# Patient Record
Sex: FEMALE | Race: WHITE | NOT HISPANIC OR LATINO | URBAN - METROPOLITAN AREA
[De-identification: names, ages, dates, MRNs, and addresses within clinical notes are randomized per-mention and may not be internally consistent; named-entity substitution may affect disease eponyms.]

---

## 2022-02-24 ENCOUNTER — MEDICAL CORRESPONDENCE (OUTPATIENT)
Dept: HEALTH INFORMATION MANAGEMENT | Facility: CLINIC | Age: 62
End: 2022-02-24
Payer: COMMERCIAL

## 2022-03-07 ENCOUNTER — TRANSFERRED RECORDS (OUTPATIENT)
Dept: HEALTH INFORMATION MANAGEMENT | Facility: CLINIC | Age: 62
End: 2022-03-07
Payer: COMMERCIAL

## 2022-03-18 ENCOUNTER — APPOINTMENT (OUTPATIENT)
Dept: RADIATION ONCOLOGY | Facility: CLINIC | Age: 62
End: 2022-03-18
Payer: COMMERCIAL

## 2022-03-18 PROCEDURE — 77263 THER RADIOLOGY TX PLNG CPLX: CPT | Performed by: RADIOLOGY

## 2022-03-18 NOTE — PROGRESS NOTES
Dear Colleagues,  Today Tiffany Herrera was seen in consultation.  IDENTIFICATION: This is a 61 year old woman with bilateral breast cancer s/p two lumpectomies and SLNBx on the left side and single lumpectomy and SLNBx on the right. On the left she had Grade 1 IDCA w/ G1 DCIS, pT1bN0 ER/UT positive HER-2/misael negative.  On the right she had G2 IDCA w/ G3 DCIS excised with close noninvasive margin, pT1cN0 ER/UT positive HER-2/misael negative.  She is now referred for adjuvant radiation therapy. Oncotype was 17 and she will not be receiving chemotherapy.  HISTORY OF PRESENT ILLNESS: Tiffany Herrera was in her usual state of health this past year. On November 26, 2021 bilateral screening mammogram showed within the left breast 12 o'clock position indeterminate calcifications.  There were also asymmetry indeterminate calcifications in the right breast at the 1 o'clock position.  On October 8, 2021 bilateral diagnostic mammogram confirmed pleomorphic calcification of the left breast 12 o'clock position and within the right breast 1 o'clock position irregular spiculated mass with associated calcifications.  By ultrasound the right breast lesion measured 1.2 cm in greatest dimension and there was no obvious right axillary lymphadenopathy.  On December 21, 2021 left breast stereotactic biopsy was consistent with fibroadenoma with calcifications.  Same-day right breast ultrasound-guided biopsy was consistent with grade 2 invasive ductal carcinoma with grade 3 DCIS, ER/UT positive HER-2/misael negative.  On December 29, 2021 breast MRI showed within the right breast 1 o'clock position a 2 cm abnormal mass with no visualized lymphadenopathy or other suspicious enhancement within the right breast.  Within the left breast there was asymmetric non-mass enhancement which is considered indeterminate.  On January 6, 2021 left breast diagnostic mammogram confirmed a spiculated mass at the 12 o'clock position in the retroareolar plane  associated with calcifications there is no definite sonographic correlate.  Stereotactic biopsy of the left breast lesion was consistent with grade 1 IDC with associated calcifications.   Then on January 18, 2021 there was MRI guided breast biopsy left upper outer quadrant area. On this additional left breast biopsy findings were consistent with ADH suspicious for low-grade DCIS.  On January 25, 2021 left breast ultrasound was completed and findings were consistent with hematoma.  There was an attempted aspiration.  It was thought that the marking clip was likely extruded to the hematoma.  Therefore on February 10, 2022 there was an another MRI guided breast biopsy.  By this time the patient was diagnosed with bilateral breast cancer.  She had newly diagnosed grade 1-2 invasive ductal carcinoma with associated grade 2-3 DCIS of the right breast and also 2 areas of disease within the left breast.  On February 17, 2022 Dr. Lewis took her to the OR for bilateral ultrasound-guided radioactive seed localized lumpectomy of both breasts.  Pathology revealed within the left breast 12 o'clock position 0.9cm of Grade 1 IDCA, with no adjacent DCIS or LVSI.  There were negative margins 0 out of 2 involved lymph nodes giving her a pathologic stage of pT1bN0 ER/AL positive HER-2/misael negative.  At the 1 o'clock position there was at least 1 cm of grade 1 DCIS with adequate margins excised. Frederic lymph node was completed.  Within the right breast there was 1.7cm of Grade 2 IDCA with DCIS and no LVSI. (DCIS was grade 3 by the biopsy) Negative invasive margins and close DCIS margins. 0 out of 2 lymph nodes removed giving her a pathologic stage of pT1cN0 ER/AL positive HER-2/misael negative  Specimen radiograph was not completed.  Her postoperative course has been unenventful but she has a diagnosis of right sided frozen shoulder.  She was recently seen by Dr. Nieto.  Her Oncotype score returned back as low 17. The benefit of  treatment did not outweigh the risks and no systemic chemotherapy is recommended.  She is to receive adjuvant endocrine therapy after XRT.    Since her surgery, she has continued to heal well and denies any significant pain.  She has fair ROM on the right and good ROM on the left.  She denies any other new masses, skin changes, shortness of breath, chest or bony pain, or new neurologic symptoms. She is being seen here today for consideration of postoperative radiotherapy.  REVIEW OF SYSTEMS: As per HPI, a 14-point review of system is otherwise negative.  PAST RADIATION THERAPY:  Denies.  PAST CTD/PACEMAKER: Denies  BREAST RISK FACTORS:  prior left breast biopsy in  benign per pt, she also has had cysts removed from both breast. No family history of ovarian cancer. Maternal grandmother with breast cancer.  She started her menses at age 11.   with her first delivery at age 21. She did not breastfeed.  Menopause early 50s. No history of HRT or OCP use.    Past Medical History:   Diagnosis Date     Adenoma of large intestine 2014    Formatting of this note might be different from the original. next colonoscopy 2017     ADHD (attention deficit hyperactivity disorder), combined type 2012     Allergic rhinitis 2006    Formatting of this note might be different from the original. Rhinitis Allergic  NOS     Cervical high risk HPV (human papillomavirus) test positive 2018    Formatting of this note might be different from the original. [facility] Review:  History:  2018: NILM HPV+16 / colp neg : NILM HPV neg  Plan, per ASCCP guidelines: co-test in 3 years ()     Combined form of senile cataract of both eyes 2020    Formatting of this note might be different from the original. Added automatically from request for surgery [id number]     Depressive disorder 2004    Formatting of this note might be different from the original. LW Onset:   ; Depression  NOS     MUKESH (generalized anxiety  disorder) 2020     Malignant neoplasm of upper-inner quadrant of right breast in female, estrogen receptor positive (H) 2021     Moderate persistent asthma without complication 2017     Osteoarthrosis, hand 2011    Formatting of this note might be different from the original. DJD Hand     Pure hypercholesterolemia 10/23/2012     Uncontrolled type 1 diabetes mellitus (H) 2013    Formatting of this note might be different from the original. Type I (juvenile type) diabetes mellitus without mention of complication, uncontrolled ([facility])       Past Surgical History:   Procedure Laterality Date     APPENDECTOMY       BREAST BIOPSY, RT/LT Left     2013     BREAST BIOPSY, RT/LT Bilateral     Bilateral- 2021, L breast bx-2022 and 2022     BREAST CYST EXCISION Bilateral     Age 19     CATARACT EXTRACTION        SECTION       LUMPECTOMY BREAST BILATERAL      Bilateral breast lumpectomy with SLN bx Dr. Lewis 2022     NC ANESTH,HUMERUS REPAIR      R humerus plate and 10 screws- Sept. 3, 2021     TUBAL LIGATION         Family History   Problem Relation Age of Onset     Prostate Cancer Father      Colon Cancer Sister      Breast Cancer Maternal Grandmother        Social History     Tobacco Use     Smoking status: Former Smoker     Packs/day: 0.50     Years: 15.00     Pack years: 7.50     Types: Cigarettes     Quit date: 2021     Years since quittin.2     Smokeless tobacco: Never Used   Substance Use Topics     Alcohol use: Yes     Alcohol/week: 5.0 standard drinks     Types: 5 Standard drinks or equivalent per week     Comment: 5 per week     Current Outpatient Medications   Medication     albuterol (PROAIR HFA/PROVENTIL HFA/VENTOLIN HFA) 108 (90 Base) MCG/ACT inhaler     albuterol (PROVENTIL) (2.5 MG/3ML) 0.083% neb solution     atorvastatin (LIPITOR) 40 MG tablet     blood glucose (CONTOUR NEXT TEST) test strip     fluticasone-salmeterol (ADVAIR HFA) 230-21 MCG/ACT  inhaler     insulin aspart (NOVOLOG PEN) 100 UNIT/ML pen     insulin glargine (LANTUS VIAL) 100 UNIT/ML vial     letrozole (FEMARA) 2.5 MG tablet     montelukast (SINGULAIR) 10 MG tablet     sertraline (ZOLOFT) 50 MG tablet     [START ON 5/23/2022] amphetamine-dextroamphetamine (ADDERALL XR) 25 MG 24 hr capsule     BD PEN NEEDLE CARLEEN 2ND GEN 32G X 4 MM miscellaneous     Continuous Blood Gluc Sensor (FREESTYLE AMOS 14 DAY SENSOR) MISC     SEMGLEE, YFGN, 100 UNIT/ML SOPN     No current facility-administered medications for this visit.        Allergies   Allergen Reactions     Azithromycin Other (See Comments)     Swelling     Aspirin Hives     Cefaclor      Cephalexin Swelling     Mouth swelling     Ciprofloxacin Hives     Ibuprofen Hives     Penicillins Hives     PHYSICAL EXAMINATION:  /71 (BP Location: Left arm, Patient Position: Sitting, Cuff Size: Adult Regular)   Pulse 95   Temp 98.6  F (37  C) (Oral)   Resp 16   Wt 59.4 kg (130 lb 14.4 oz)   SpO2 97%   GENERAL Well-appearing woman in no acute distress.  HEENT Normocephalic, atraumatic.  Sclerae anicteric.  CVR  Regular rate and rhythm.  No murmurs, rubs, or gallops.  LUNGS Clear to auscultation bilaterally.  BREASTS Breasts are examined in the supine and upright position.  The breasts are symmetric.  Within the right upper outer breast and right axilla there are two well healed incisions.  Firmness of these incisions is not suspicious.  There is no suspicious erythema, ulceration or nodularity within the right breast. Within the left upper and periareolar breast and left axilla there are three well healed incisions.  Firmness of these incisions is not suspicious.  There is no suspicious erythema, ulceration or nodularity within the left breast.  There is no erythema, retraction, desquamation or discharge appreciated within the right or left nipple areolar complex.    LYMPH No supraclavicular, infraclavicular, or axillary lymphadenopathy appreciated  bilaterally.  ABDOMEN Soft.    EXT  No clubbing or cyanosis or edema.    NEURO No focal deficits.  MSK  She has fair ROM on the right and good ROM on the left.    SKIN  Warm and well perfused.    PSYCH  Alert and oriented x 3    ECOG PERFORMANCE STATUS: 1    IMPRESSION/PLAN: Tiffany Herrera is a 61 year old woman with bilateral breast cancer s/p two lumpectomies and SLNBx on the left side and single lumpectomy and SLNBx on the right. On the left she had Grade 1 IDCA w/ G1 DCIS, pT1bN0 ER/TX positive HER-2/misael negative.  On the right she had G2 IDCA w/ G3 DCIS excised with close noninvasive margin, pT1cN0 ER/TX positive HER-2/misael negative.  She is now referred for adjuvant radiation therapy. Oncotype was 17 and she will not be receiving chemotherapy.  I recommend adjuvant XRT to both breasts to improve local control and overall survival.  Plan is to treat the affected breasts based on multiple randomized prospective data which show decrease risk of local recurrence by ~50% with the addition of XRT to BCS and the EBCTCG metaanalysis published in Lancet 2011 which shows that for every 4 recurrences avoided by year 10 one breast cancer death is avoided by year 15.  Given her HG disease and close DCIS margin she will also receive a tumor cavity boost based EORTC and Mccrary Boost Trials.  The risks, benefits, treatment rationale and regimen of radiation therapy to the breast were discussed in great detail today with the patient and her .  Risks include but are not limited to skin erythema, desquamation, hyperpigmentation, breast and lymphedema, fibrosis, telengectasia, pneumonitis, cardiac toxicity, rib fracture, exacerbation of her frozen shoulder, shoulder discomfort and arthritis and secondary malignancy. The patient consented to therapy and is scheduled for a CT simulation to be completed in approximately 2 weeks.  In the interim I have asked her to continue with her physical therapy to get her right arm into the  appropriate position for radiation treatment.  Additional problem list to be addressed in the following manner:  1. Systemic/hormonal treatment : No systemic chemo recommended.  Will f/u with Med Onc 1-2 weeks after XRT completed to discuss adjuvant endocrine therapy.   2. Bilateral postop mmg ordered.  There was ample time for questions and all were answered to the patient's satisfaction. Thank you for allowing me to participate in the care of this pleasant patient. If you have any questions, please do not hesitate to contact my office.    Sincerely,  Woodrow Billingsley MD

## 2022-03-21 ENCOUNTER — OFFICE VISIT (OUTPATIENT)
Dept: RADIATION ONCOLOGY | Facility: CLINIC | Age: 62
End: 2022-03-21
Payer: COMMERCIAL

## 2022-03-21 VITALS
SYSTOLIC BLOOD PRESSURE: 129 MMHG | RESPIRATION RATE: 16 BRPM | OXYGEN SATURATION: 97 % | TEMPERATURE: 98.6 F | DIASTOLIC BLOOD PRESSURE: 71 MMHG | HEART RATE: 95 BPM | WEIGHT: 130.9 LBS

## 2022-03-21 DIAGNOSIS — C50.211 MALIGNANT NEOPLASM OF UPPER-INNER QUADRANT OF RIGHT BREAST IN FEMALE, ESTROGEN RECEPTOR POSITIVE (H): Primary | ICD-10-CM

## 2022-03-21 DIAGNOSIS — Z17.0 MALIGNANT NEOPLASM OF UPPER-INNER QUADRANT OF RIGHT BREAST IN FEMALE, ESTROGEN RECEPTOR POSITIVE (H): Primary | ICD-10-CM

## 2022-03-21 DIAGNOSIS — C50.812 MALIGNANT NEOPLASM OF OVERLAPPING SITES OF LEFT BREAST IN FEMALE, ESTROGEN RECEPTOR POSITIVE (H): ICD-10-CM

## 2022-03-21 DIAGNOSIS — Z17.0 MALIGNANT NEOPLASM OF OVERLAPPING SITES OF LEFT BREAST IN FEMALE, ESTROGEN RECEPTOR POSITIVE (H): ICD-10-CM

## 2022-03-21 PROBLEM — J45.40 MODERATE PERSISTENT ASTHMA WITHOUT COMPLICATION: Status: ACTIVE | Noted: 2017-09-11

## 2022-03-21 PROBLEM — F41.1 GAD (GENERALIZED ANXIETY DISORDER): Status: ACTIVE | Noted: 2020-09-20

## 2022-03-21 PROBLEM — R87.810 CERVICAL HIGH RISK HPV (HUMAN PAPILLOMAVIRUS) TEST POSITIVE: Status: ACTIVE | Noted: 2018-07-24

## 2022-03-21 PROBLEM — H25.813 COMBINED FORM OF SENILE CATARACT OF BOTH EYES: Status: ACTIVE | Noted: 2020-12-17

## 2022-03-21 PROCEDURE — 99205 OFFICE O/P NEW HI 60 MIN: CPT | Performed by: RADIOLOGY

## 2022-03-21 RX ORDER — FLUTICASONE PROPIONATE AND SALMETEROL XINAFOATE 230; 21 UG/1; UG/1
2 AEROSOL, METERED RESPIRATORY (INHALATION)
COMMUNITY
Start: 2020-04-20

## 2022-03-21 RX ORDER — INSULIN GLARGINE 100 [IU]/ML
14 INJECTION, SOLUTION SUBCUTANEOUS
COMMUNITY
Start: 2022-02-09 | End: 2023-02-09

## 2022-03-21 RX ORDER — PEN NEEDLE, DIABETIC 32GX 5/32"
NEEDLE, DISPOSABLE MISCELLANEOUS
COMMUNITY
Start: 2022-02-09

## 2022-03-21 RX ORDER — FLASH GLUCOSE SENSOR
KIT MISCELLANEOUS
COMMUNITY
Start: 2022-02-10

## 2022-03-21 RX ORDER — INSULIN GLARGINE-YFGN 100 [IU]/ML
INJECTION, SOLUTION SUBCUTANEOUS
COMMUNITY
Start: 2022-01-25

## 2022-03-21 RX ORDER — ATORVASTATIN CALCIUM 40 MG/1
40 TABLET, FILM COATED ORAL DAILY
COMMUNITY
Start: 2022-02-09

## 2022-03-21 RX ORDER — ALBUTEROL SULFATE 90 UG/1
AEROSOL, METERED RESPIRATORY (INHALATION)
COMMUNITY
Start: 2021-06-02

## 2022-03-21 RX ORDER — ALBUTEROL SULFATE 0.83 MG/ML
SOLUTION RESPIRATORY (INHALATION)
COMMUNITY
Start: 2021-06-15

## 2022-03-21 RX ORDER — MONTELUKAST SODIUM 10 MG/1
10 TABLET ORAL
COMMUNITY
Start: 2021-08-07 | End: 2022-08-07

## 2022-03-21 RX ORDER — LETROZOLE 2.5 MG/1
2.5 TABLET, FILM COATED ORAL DAILY
COMMUNITY
Start: 2022-03-17 | End: 2023-03-17

## 2022-03-21 RX ORDER — DEXTROAMPHETAMINE SACCHARATE, AMPHETAMINE ASPARTATE MONOHYDRATE, DEXTROAMPHETAMINE SULFATE AND AMPHETAMINE SULFATE 6.25; 6.25; 6.25; 6.25 MG/1; MG/1; MG/1; MG/1
25 CAPSULE, EXTENDED RELEASE ORAL
COMMUNITY
Start: 2022-05-23

## 2022-03-21 ASSESSMENT — PAIN SCALES - GENERAL: PAINLEVEL: MILD PAIN (2)

## 2022-03-21 NOTE — Clinical Note
3/21/2022         RE: Tiffany Herrera  [id number]  [facility] [address]        Dear Colleague,    Thank you for referring your patient, Tiffany Herrera, to the [facility] RADIATION ONCOLOGY [place]. Please see a copy of my visit note below.    Dear Colleagues,  Today Tiffany Herrera was seen in consultation.  IDENTIFICATION: This is a 61 year old woman with bilateral breast cancer s/p two lumpectomies and SLNBx on the left side and single lumpectomy and SLNBx on the right. On the left she had Grade 1 IDCA w/ G1 DCIS, pT1bN0 ER/TX positive HER-2/misael negative.  On the right she had G2 IDCA w/ G3 DCIS excised with close noninvasive margin, pT1cN0 ER/TX positive HER-2/misael negative.  She is now referred for adjuvant radiation therapy. Oncotype was 17 and she will not be receiving chemotherapy.  HISTORY OF PRESENT ILLNESS: Tiffany Herrera was in her usual state of health this past year. On November 26, 2021 bilateral screening mammogram showed within the left breast 12 o'clock position indeterminate calcifications.  There were also asymmetry indeterminate calcifications in the right breast at the 1 o'clock position.  On October 8, 2021 bilateral diagnostic mammogram confirmed pleomorphic calcification of the left breast 12 o'clock position and within the right breast 1 o'clock position irregular spiculated mass with associated calcifications.  By ultrasound the right breast lesion measured 1.2 cm in greatest dimension and there was no obvious right axillary lymphadenopathy.  On December 21, 2021 left breast stereotactic biopsy was consistent with fibroadenoma with calcifications.  Same-day right breast ultrasound-guided biopsy was consistent with grade 2 invasive ductal carcinoma with grade 3 DCIS, ER/TX positive HER-2/misael negative.  On December 29, 2021 breast MRI showed within the right breast 1 o'clock position a 2 cm abnormal mass with no visualized lymphadenopathy or other suspicious enhancement  within the right breast.  Within the left breast there was asymmetric non-mass enhancement which is considered indeterminate.  On January 6, 2021 left breast diagnostic mammogram confirmed a spiculated mass at the 12 o'clock position in the retroareolar plane associated with calcifications there is no definite sonographic correlate.  Stereotactic biopsy of the left breast lesion was consistent with grade 1 IDC with associated calcifications.   Then on January 18, 2021 there was MRI guided breast biopsy left upper outer quadrant area. On this additional left breast biopsy findings were consistent with ADH suspicious for low-grade DCIS.  On January 25, 2021 left breast ultrasound was completed and findings were consistent with hematoma.  There was an attempted aspiration.  It was thought that the marking clip was likely extruded to the hematoma.  Therefore on February 10, 2022 there was an another MRI guided breast biopsy.  By this time the patient was diagnosed with bilateral breast cancer.  She had newly diagnosed grade 1-2 invasive ductal carcinoma with associated grade 2-3 DCIS of the right breast and also 2 areas of disease within the left breast.  On February 17, 2022 Dr. Lewis took her to the OR for bilateral ultrasound-guided radioactive seed localized lumpectomy of both breasts.  Pathology revealed within the left breast 12 o'clock position 0.9cm of Grade 1 IDCA, with no adjacent DCIS or LVSI.  There were negative margins 0 out of 2 involved lymph nodes giving her a pathologic stage of pT1bN0 ER/MI positive HER-2/misael negative.  At the 1 o'clock position there was at least 1 cm of grade 1 DCIS with adequate margins excised. [place] lymph node was completed.  Within the right breast there was 1.7cm of Grade 2 IDCA with DCIS and no LVSI. (DCIS was grade 3 by the biopsy) Negative invasive margins and close DCIS margins. 0 out of 2 lymph nodes removed giving her a pathologic stage of pT1cN0 ER/MI positive  HER-2/misael negative  Specimen radiograph was not completed.  Her postoperative course has been unenventful but she has a diagnosis of right sided frozen shoulder.  She was recently seen by Dr. Nieto.  Her Oncotype score returned back as low 17. The benefit of treatment did not outweigh the risks and no systemic chemotherapy is recommended.  She is to receive adjuvant endocrine therapy after XRT.    Since her surgery, she has continued to heal well and denies any significant pain.  She has fair ROM on the right and good ROM on the left.  She denies any other new masses, skin changes, shortness of breath, chest or bony pain, or new neurologic symptoms. She is being seen here today for consideration of postoperative radiotherapy.  REVIEW OF SYSTEMS: As per HPI, a 14-point review of system is otherwise negative.  PAST RADIATION THERAPY:  Denies.  PAST CTD/PACEMAKER: Denies  BREAST RISK FACTORS:  prior left breast biopsy in  benign per pt, she also has had cysts removed from both breast. No family history of ovarian cancer. Maternal grandmother with breast cancer.  She started her menses at age 11.   with her first delivery at age 21. She did not breastfeed.  Menopause early 50s. No history of HRT or OCP use.    Past Medical History:   Diagnosis Date     Adenoma of large intestine 2014    Formatting of this note might be different from the original. next colonoscopy 2017     ADHD (attention deficit hyperactivity disorder), combined type 2012     Allergic rhinitis 2006    Formatting of this note might be different from the original. Rhinitis Allergic  NOS     Cervical high risk HPV (human papillomavirus) test positive 2018    Formatting of this note might be different from the original. [facility] Review:  History:  2018: NILM HPV+16 / colp neg : NILM HPV neg  Plan, per ASCCP guidelines: co-test in 3 years ()     Combined form of senile cataract of both eyes 2020    Formatting of  this note might be different from the original. Added automatically from request for surgery [id number]     Depressive disorder 2004    Formatting of this note might be different from the original. LW Onset:   ; Depression  NOS     MUKESH (generalized anxiety disorder) 2020     Malignant neoplasm of upper-inner quadrant of right breast in female, estrogen receptor positive (H) 2021     Moderate persistent asthma without complication 2017     Osteoarthrosis, hand 2011    Formatting of this note might be different from the original. DJD Hand     Pure hypercholesterolemia 10/23/2012     Uncontrolled type 1 diabetes mellitus (H) 2013    Formatting of this note might be different from the original. Type I (juvenile type) diabetes mellitus without mention of complication, uncontrolled ([facility]C)       Past Surgical History:   Procedure Laterality Date     APPENDECTOMY       BREAST BIOPSY, RT/LT Left     2013     BREAST BIOPSY, RT/LT Bilateral     Bilateral- 2021, L breast bx-2022 and 2022     BREAST CYST EXCISION Bilateral     Age 19     CATARACT EXTRACTION        SECTION       LUMPECTOMY BREAST BILATERAL      Bilateral breast lumpectomy with SLN bx Dr. Lewis 2022     RI ANESTH,HUMERUS REPAIR      R humerus plate and 10 screws- Sept. 3, 2021     TUBAL LIGATION         Family History   Problem Relation Age of Onset     Prostate Cancer Father      Colon Cancer Sister      Breast Cancer Maternal Grandmother        Social History     Tobacco Use     Smoking status: Former Smoker     Packs/day: 0.50     Years: 15.00     Pack years: 7.50     Types: Cigarettes     Quit date: 2021     Years since quittin.2     Smokeless tobacco: Never Used   Substance Use Topics     Alcohol use: Yes     Alcohol/week: 5.0 standard drinks     Types: 5 Standard drinks or equivalent per week     Comment: 5 per week     Current Outpatient Medications   Medication     albuterol (PROAIR  HFA/PROVENTIL HFA/VENTOLIN HFA) 108 (90 Base) MCG/ACT inhaler     albuterol (PROVENTIL) (2.5 MG/3ML) 0.083% neb solution     atorvastatin (LIPITOR) 40 MG tablet     blood glucose (CONTOUR NEXT TEST) test strip     fluticasone-salmeterol (ADVAIR HFA) 230-21 MCG/ACT inhaler     insulin aspart (NOVOLOG PEN) 100 UNIT/ML pen     insulin glargine (LANTUS VIAL) 100 UNIT/ML vial     letrozole (FEMARA) 2.5 MG tablet     montelukast (SINGULAIR) 10 MG tablet     sertraline (ZOLOFT) 50 MG tablet     [START ON 5/23/2022] amphetamine-dextroamphetamine (ADDERALL XR) 25 MG 24 hr capsule     BD PEN NEEDLE CARLEEN 2ND GEN 32G X 4 MM miscellaneous     Continuous Blood Gluc Sensor (FREESTYLE AMOS 14 DAY SENSOR) MISC     SEMGLEE, YFGN, 100 UNIT/ML SOPN     No current facility-administered medications for this visit.        Allergies   Allergen Reactions     Azithromycin Other (See Comments)     Swelling     Aspirin Hives     Cefaclor      Cephalexin Swelling     Mouth swelling     Ciprofloxacin Hives     Ibuprofen Hives     Penicillins Hives     PHYSICAL EXAMINATION:  /71 (BP Location: Left arm, Patient Position: Sitting, Cuff Size: Adult Regular)   Pulse 95   Temp 98.6  F (37  C) (Oral)   Resp 16   Wt 59.4 kg (130 lb 14.4 oz)   SpO2 97%   GENERAL Well-appearing woman in no acute distress.  HEENT Normocephalic, atraumatic.  Sclerae anicteric.  CVR  Regular rate and rhythm.  No murmurs, rubs, or gallops.  LUNGS Clear to auscultation bilaterally.  BREASTS Breasts are examined in the supine and upright position.  The breasts are symmetric.  Within the right upper outer breast and right axilla there are two well healed incisions.  Firmness of these incisions is not suspicious.  There is no suspicious erythema, ulceration or nodularity within the right breast. Within the left upper and periareolar breast and left axilla there are three well healed incisions.  Firmness of these incisions is not suspicious.  There is no suspicious  erythema, ulceration or nodularity within the left breast.  There is no erythema, retraction, desquamation or discharge appreciated within the right or left nipple areolar complex.    LYMPH No supraclavicular, infraclavicular, or axillary lymphadenopathy appreciated bilaterally.  ABDOMEN Soft.    EXT  No clubbing or cyanosis or edema.    NEURO No focal deficits.  MSK  She has fair ROM on the right and good ROM on the left.    SKIN  Warm and well perfused.    PSYCH  Alert and oriented x 3    ECOG PERFORMANCE STATUS: 1    IMPRESSION/PLAN: Tiffany Herrera is a 61 year old woman with bilateral breast cancer s/p two lumpectomies and SLNBx on the left side and single lumpectomy and SLNBx on the right. On the left she had Grade 1 IDCA w/ G1 DCIS, pT1bN0 ER/[place] positive HER-2/misael negative.  On the right she had G2 IDCA w/ G3 DCIS excised with close noninvasive margin, pT1cN0 ER/[place] positive HER-2/misael negative.  She is now referred for adjuvant radiation therapy. Oncotype was 17 and she will not be receiving chemotherapy.  I recommend adjuvant XRT to both breasts to improve local control and overall survival.  Plan is to treat the affected breasts based on multiple randomized prospective data which show decrease risk of local recurrence by ~50% with the addition of XRT to BCS and the EBCTCG metaanalysis published in Lancet 2011 which shows that for every 4 recurrences avoided by year 10 one breast cancer death is avoided by year 15.  Given her HG disease and close DCIS margin she will also receive a tumor cavity boost based EORTC and Mccrary Boost Trials.  The risks, benefits, treatment rationale and regimen of radiation therapy to the breast were discussed in great detail today with the patient and her .  Risks include but are not limited to skin erythema, desquamation, hyperpigmentation, breast and lymphedema, fibrosis, telengectasia, pneumonitis, cardiac toxicity, rib fracture, exacerbation of her frozen shoulder,  shoulder discomfort and arthritis and secondary malignancy. The patient consented to therapy and is scheduled for a CT simulation to be completed in approximately 2 weeks.  In the interim I have asked her to continue with her physical therapy to get her right arm into the appropriate position for radiation treatment.  Additional problem list to be addressed in the following manner:  1. Systemic/hormonal treatment : No systemic chemo recommended.  Will f/u with Med Onc 1-2 weeks after XRT completed to discuss adjuvant endocrine therapy.   2. Bilateral postop mmg ordered.  There was ample time for questions and all were answered to the patient's satisfaction. Thank you for allowing me to participate in the care of this pleasant patient. If you have any questions, please do not hesitate to contact my office.    Sincerely,  Woodrow Billingsley MD            Again, thank you for allowing me to participate in the care of your patient.        Sincerely,        ELMER Billingsley MD

## 2022-03-21 NOTE — NURSING NOTE
REASON FOR APPOINTMENT   Type of Cancer: Bilateral breast cancer ER+  Location: Bilateral breasts  Date of Symptom Onset: Screening mammogram 11/26/2021    TREATMENT TO-DATE FOR THIS CANCER  Surgery ? Bilateral breast lumpectomy with SLN bx Dr. Lewis 2/17/2022   Chemotherapy ? no   Other Treatments for this Cancer ? no    PERSONAL HISTORY OF CANCER   Previous Cancer ? no   Prior Radiation ? no   Prior Chemotherapy ? no   Prior Hormonal Therapy ? no     RECENT IMAGING STUDIES  MRI (date: 1/18/2022, location: [facility])    REFERRALS NEEDED  no    VITALS  /71 (BP Location: Left arm, Patient Position: Sitting, Cuff Size: Adult Regular)   Pulse 95   Temp 98.6  F (37  C) (Oral)   Resp 16   Wt 59.4 kg (130 lb 14.4 oz)   SpO2 97%     PACEMAKER/IMPLANTED CARDIAC DEVICE no    PAIN  Denies pain related to visit    PSYCHOSOCIAL  Marital Status:   Patient lives in home with spouse.  Number of children: 2  Working status: Patient works at a bank  Do you feel safe in your home? Yes    REVIEW OF SYSTEMS  Skin: negative  Eyes: positive for glasses  Ears/Nose/Throat: negative  Respiratory: No shortness of breath, dyspnea on exertion, cough, or hemoptysis  Cardiovascular: negative  Gastrointestinal: negative  Genitourinary: negative  Musculoskeletal: positive for arthritis, joint pain and joint stiffness  Neurologic: negative  Psychiatric: negative  Hematologic/Lymphatic/Immunologic: negative  Endocrine: positive for diabetes    WOMEN ONLY  Any chance you may be pregnant: No  Age at first period: 11  Date of last period: menopause early 50's  Number of pregnancies: 2  Live births: 2  Age 1st delivery: 21  Breast feeding: no  HRT: no  Birth Control pills: No      Radiation Oncology Patient Teaching    Current Concern: Bilateral breast cancer    Person involved with teaching: Patient and   Patient asked Questions: Yes  Patient was cooperative: Yes  Patient was receptive (willing to accept information  given): Yes    Education Assessment  Comprehension ability: High  Knowledge level: Medium  Factors affecting teaching: None    Education Materials Given  Radiation therapy side effects for breast cancer, skin cares, fatigue    Educational Topics Discussed  Side effects    Response To Teaching  Verbalizes understanding    Do you have an advanced directive or living will? no  Are you DNR/DNI? no    Danae Yang RN

## 2022-03-22 ENCOUNTER — PATIENT OUTREACH (OUTPATIENT)
Dept: RADIATION ONCOLOGY | Facility: CLINIC | Age: 62
End: 2022-03-22

## 2022-03-22 ENCOUNTER — APPOINTMENT (OUTPATIENT)
Dept: RADIATION ONCOLOGY | Facility: CLINIC | Age: 62
End: 2022-03-22
Payer: COMMERCIAL

## 2022-03-22 PROCEDURE — 77332 RADIATION TREATMENT AID(S): CPT | Mod: 59 | Performed by: RADIOLOGY

## 2022-03-22 PROCEDURE — 77334 RADIATION TREATMENT AID(S): CPT | Performed by: RADIOLOGY

## 2022-03-22 PROCEDURE — 77290 THER RAD SIMULAJ FIELD CPLX: CPT | Performed by: RADIOLOGY

## 2022-03-22 NOTE — PROGRESS NOTES
Informed patient that order for mammogram was faxed to [facility] and they will be contacting her to schedule the appointment. Patient to please call our office with date of mammogram once it is scheduled. Provided Dr. Billingsley's office number to patient. No further questions or concerns at this time.     Danae Yang RN

## 2022-03-23 DIAGNOSIS — Z17.0 MALIGNANT NEOPLASM OF UPPER-INNER QUADRANT OF RIGHT BREAST IN FEMALE, ESTROGEN RECEPTOR POSITIVE (H): Primary | ICD-10-CM

## 2022-03-23 DIAGNOSIS — C50.211 MALIGNANT NEOPLASM OF UPPER-INNER QUADRANT OF RIGHT BREAST IN FEMALE, ESTROGEN RECEPTOR POSITIVE (H): Primary | ICD-10-CM

## 2022-03-31 ENCOUNTER — LAB (OUTPATIENT)
Dept: LAB | Facility: CLINIC | Age: 62
End: 2022-03-31
Payer: COMMERCIAL

## 2022-03-31 DIAGNOSIS — C50.211 MALIGNANT NEOPLASM OF UPPER-INNER QUADRANT OF RIGHT BREAST IN FEMALE, ESTROGEN RECEPTOR POSITIVE (H): ICD-10-CM

## 2022-03-31 DIAGNOSIS — Z17.0 MALIGNANT NEOPLASM OF UPPER-INNER QUADRANT OF RIGHT BREAST IN FEMALE, ESTROGEN RECEPTOR POSITIVE (H): ICD-10-CM

## 2022-03-31 LAB — SARS-COV-2 RNA RESP QL NAA+PROBE: NEGATIVE

## 2022-03-31 PROCEDURE — U0005 INFEC AGEN DETEC AMPLI PROBE: HCPCS

## 2022-03-31 PROCEDURE — U0003 INFECTIOUS AGENT DETECTION BY NUCLEIC ACID (DNA OR RNA); SEVERE ACUTE RESPIRATORY SYNDROME CORONAVIRUS 2 (SARS-COV-2) (CORONAVIRUS DISEASE [COVID-19]), AMPLIFIED PROBE TECHNIQUE, MAKING USE OF HIGH THROUGHPUT TECHNOLOGIES AS DESCRIBED BY CMS-2020-01-R: HCPCS

## 2022-04-03 ENCOUNTER — HEALTH MAINTENANCE LETTER (OUTPATIENT)
Age: 62
End: 2022-04-03

## 2022-04-04 ENCOUNTER — PATIENT OUTREACH (OUTPATIENT)
Dept: RADIATION ONCOLOGY | Facility: CLINIC | Age: 62
End: 2022-04-04
Payer: COMMERCIAL

## 2022-04-04 ENCOUNTER — APPOINTMENT (OUTPATIENT)
Dept: RADIATION ONCOLOGY | Facility: CLINIC | Age: 62
End: 2022-04-04
Payer: COMMERCIAL

## 2022-04-04 PROCEDURE — 77334 RADIATION TREATMENT AID(S): CPT | Performed by: RADIOLOGY

## 2022-04-04 PROCEDURE — 77295 3-D RADIOTHERAPY PLAN: CPT | Performed by: RADIOLOGY

## 2022-04-04 PROCEDURE — 77300 RADIATION THERAPY DOSE PLAN: CPT | Performed by: RADIOLOGY

## 2022-04-04 PROCEDURE — 77293 RESPIRATOR MOTION MGMT SIMUL: CPT | Performed by: RADIOLOGY

## 2022-04-04 NOTE — PROGRESS NOTES
Left message for patient to inform her that Dr. Billingsley had ordered a bilateral mammogram to be done and only one side was done. [facility] will be calling her to schedule another mammogram. Informed patient in message that we will need to adjust her start date to be after this is completed. Patient to call Dr. Billingsley's office to discuss further questions or concerns.    Danae Yang RN

## 2022-04-05 ENCOUNTER — APPOINTMENT (OUTPATIENT)
Dept: RADIATION ONCOLOGY | Facility: CLINIC | Age: 62
End: 2022-04-05
Payer: COMMERCIAL

## 2022-04-05 PROCEDURE — 77280 THER RAD SIMULAJ FIELD SMPL: CPT | Performed by: RADIOLOGY

## 2022-04-06 ENCOUNTER — APPOINTMENT (OUTPATIENT)
Dept: RADIATION ONCOLOGY | Facility: CLINIC | Age: 62
End: 2022-04-06
Payer: COMMERCIAL

## 2022-04-06 ENCOUNTER — OFFICE VISIT (OUTPATIENT)
Dept: RADIATION ONCOLOGY | Facility: CLINIC | Age: 62
End: 2022-04-06
Payer: COMMERCIAL

## 2022-04-06 VITALS
SYSTOLIC BLOOD PRESSURE: 146 MMHG | RESPIRATION RATE: 16 BRPM | HEART RATE: 85 BPM | WEIGHT: 130.6 LBS | TEMPERATURE: 98 F | OXYGEN SATURATION: 98 % | DIASTOLIC BLOOD PRESSURE: 80 MMHG

## 2022-04-06 DIAGNOSIS — C50.211 MALIGNANT NEOPLASM OF UPPER-INNER QUADRANT OF RIGHT BREAST IN FEMALE, ESTROGEN RECEPTOR POSITIVE (H): Primary | ICD-10-CM

## 2022-04-06 DIAGNOSIS — Z17.0 MALIGNANT NEOPLASM OF UPPER-INNER QUADRANT OF RIGHT BREAST IN FEMALE, ESTROGEN RECEPTOR POSITIVE (H): Primary | ICD-10-CM

## 2022-04-06 PROCEDURE — 77412 RADIATION TX DELIVERY LVL 3: CPT | Performed by: RADIOLOGY

## 2022-04-06 PROCEDURE — 99207 PR DROP WITH A PROCEDURE: CPT | Performed by: RADIOLOGY

## 2022-04-06 ASSESSMENT — PAIN SCALES - GENERAL: PAINLEVEL: NO PAIN (0)

## 2022-04-06 NOTE — PROGRESS NOTES
[facility] PHYSICIANS  SPECIALIZING IN BREAKTHROUGHS  Radiation Oncology    On Treatment Visit Note      Tiffany Herrera      Date: 2022   MRN: [id number]   : 1960  Diagnosis: Bilateral breast cancer ER+      Reason for Visit:  On Radiation Treatment Visit     Treatment Summary to Date    Current Dose: 266/5256, 266/5256 cGy Fractions: ,       Chemotherapy  Chemo concurrent with radx?: No (Dr. Nieto)    Subjective:     1st day of treatment today.  Doing well with no complaints      Nursing ROS:   Nutrition Alteration  Diet Type: Patient's Preference  Skin  Skin Reaction: 0 - No changes  Skin Intervention: Aquaphor BID  Skin Note: Patient requests to try radiation relief cream she ordered online. Discussed Aquaphor or vanicream use is recommended.        Cardiovascular  Respiratory effort: 1 - Normal - without distress        Psychosocial  Psychosocial Note: Patient denies fatigue  Pain Assessment  0-10 Pain Scale: 0  Pain Note: Patient has plate in R shoulder, attending PT 2 x week and doing stretches at home.      Objective:   BP (!) 146/80 (BP Location: Left arm, Patient Position: Sitting, Cuff Size: Adult Regular)   Pulse 85   Temp 98  F (36.7  C) (Oral)   Resp 16   Wt 59.2 kg (130 lb 9.6 oz)   SpO2 98%   Gen: Appears well, in no acute distress  Skin: No erythema    Labs:  CBC RESULTS: No results for input(s): WBC, RBC, HGB, HCT, MCV, MCH, MCHC, RDW, PLT in the last 87653 hours.  ELECTROLYTES:  No results for input(s): NA, POTASSIUM, CHLORIDE, DONNA, CO2, BUN, CR, GLC in the last 89165 hours.    Assessment:    Tolerating radiation therapy well.  All questions and concerns addressed.    Plan:   1. Continue current therapy.    2. Skin care per above.      Mosaiq chart and setup information reviewed  Ports checked    Medication Review  Med list reviewed with patient?: Yes    Educational Topic Discussed  Education Instructions: Skin cares, fatigue, PT         Woodrow Billingsley  MD    Please do not send letter to referring physician.

## 2022-04-06 NOTE — LETTER
2022         RE: Tiffany Herrera  [id number]  [facility] [address]        Dear Colleague,    Thank you for referring your patient, Tiffany Herrera, to the [facility] RADIATION ONCOLOGY [place]. Please see a copy of my visit note below.    [facility] PHYSICIANS  SPECIALIZING IN BREAKTHROUGHS  Radiation Oncology    On Treatment Visit Note      Tiffany Herrera      Date: 2022   MRN: [id number]   : 1960  Diagnosis: Bilateral breast cancer ER+      Reason for Visit:  On Radiation Treatment Visit     Treatment Summary to Date    Current Dose: 266/5256, 266/5256 cGy Fractions: ,       Chemotherapy  Chemo concurrent with radx?: No (Dr. Nieto)    Subjective:     1st day of treatment today.  Doing well with no complaints      Nursing ROS:   Nutrition Alteration  Diet Type: Patient's Preference  Skin  Skin Reaction: 0 - No changes  Skin Intervention: Aquaphor BID  Skin Note: Patient requests to try radiation relief cream she ordered online. Discussed Aquaphor or vanicream use is recommended.        Cardiovascular  Respiratory effort: 1 - Normal - without distress        Psychosocial  Psychosocial Note: Patient denies fatigue  Pain Assessment  0-10 Pain Scale: 0  Pain Note: Patient has plate in R shoulder, attending PT 2 x week and doing stretches at home.      Objective:   BP (!) 146/80 (BP Location: Left arm, Patient Position: Sitting, Cuff Size: Adult Regular)   Pulse 85   Temp 98  F (36.7  C) (Oral)   Resp 16   Wt 59.2 kg (130 lb 9.6 oz)   SpO2 98%   Gen: Appears well, in no acute distress  Skin: No erythema    Labs:  CBC RESULTS: No results for input(s): WBC, RBC, HGB, HCT, MCV, MCH, MCHC, RDW, PLT in the last 83341 hours.  ELECTROLYTES:  No results for input(s): NA, POTASSIUM, CHLORIDE, DONNA, CO2, BUN, CR, GLC in the last 01109 hours.    Assessment:    Tolerating radiation therapy well.  All questions and concerns addressed.    Plan:   1. Continue current  therapy.    2. Skin care per above.      Mosaiq chart and setup information reviewed  Ports checked    Medication Review  Med list reviewed with patient?: Yes    Educational Topic Discussed  Education Instructions: Skin cares, fatigue, PT         Woodrow Billingsley MD    Please do not send letter to referring physician.          Again, thank you for allowing me to participate in the care of your patient.        Sincerely,        ELMER Billingsley MD

## 2022-04-07 ENCOUNTER — APPOINTMENT (OUTPATIENT)
Dept: RADIATION ONCOLOGY | Facility: CLINIC | Age: 62
End: 2022-04-07
Payer: COMMERCIAL

## 2022-04-07 PROCEDURE — 77387 GUIDANCE FOR RADJ TX DLVR: CPT | Performed by: SURGERY

## 2022-04-07 PROCEDURE — 77412 RADIATION TX DELIVERY LVL 3: CPT | Performed by: SURGERY

## 2022-04-08 ENCOUNTER — APPOINTMENT (OUTPATIENT)
Dept: RADIATION ONCOLOGY | Facility: CLINIC | Age: 62
End: 2022-04-08
Payer: COMMERCIAL

## 2022-04-08 PROCEDURE — 77387 GUIDANCE FOR RADJ TX DLVR: CPT | Performed by: RADIOLOGY

## 2022-04-08 PROCEDURE — 77412 RADIATION TX DELIVERY LVL 3: CPT | Performed by: RADIOLOGY

## 2022-04-11 ENCOUNTER — APPOINTMENT (OUTPATIENT)
Dept: RADIATION ONCOLOGY | Facility: CLINIC | Age: 62
End: 2022-04-11
Payer: COMMERCIAL

## 2022-04-11 PROCEDURE — 77387 GUIDANCE FOR RADJ TX DLVR: CPT | Performed by: RADIOLOGY

## 2022-04-11 PROCEDURE — 77412 RADIATION TX DELIVERY LVL 3: CPT | Performed by: RADIOLOGY

## 2022-04-12 ENCOUNTER — OFFICE VISIT (OUTPATIENT)
Dept: RADIATION ONCOLOGY | Facility: CLINIC | Age: 62
End: 2022-04-12
Payer: COMMERCIAL

## 2022-04-12 VITALS
TEMPERATURE: 97.4 F | RESPIRATION RATE: 16 BRPM | HEART RATE: 90 BPM | WEIGHT: 131.2 LBS | SYSTOLIC BLOOD PRESSURE: 122 MMHG | DIASTOLIC BLOOD PRESSURE: 72 MMHG | OXYGEN SATURATION: 97 %

## 2022-04-12 DIAGNOSIS — C50.211 MALIGNANT NEOPLASM OF UPPER-INNER QUADRANT OF RIGHT BREAST IN FEMALE, ESTROGEN RECEPTOR POSITIVE (H): Primary | ICD-10-CM

## 2022-04-12 DIAGNOSIS — Z17.0 MALIGNANT NEOPLASM OF UPPER-INNER QUADRANT OF RIGHT BREAST IN FEMALE, ESTROGEN RECEPTOR POSITIVE (H): Primary | ICD-10-CM

## 2022-04-12 PROCEDURE — 99207 PR DROP WITH A PROCEDURE: CPT | Performed by: RADIOLOGY

## 2022-04-12 RX ORDER — HYDROCORTISONE 2.5 %
CREAM (GRAM) TOPICAL 2 TIMES DAILY
Qty: 30 G | Refills: 1 | Status: SHIPPED | OUTPATIENT
Start: 2022-04-12 | End: 2022-05-04

## 2022-04-12 ASSESSMENT — PAIN SCALES - GENERAL: PAINLEVEL: MILD PAIN (2)

## 2022-04-12 NOTE — LETTER
2022         RE: Tiffany Herrera  [address]  [facility] [address]        Dear Colleague,    Thank you for referring your patient, Tiffany Herrera, to the [facility] RADIATION ONCOLOGY [place]. Please see a copy of my visit note below.    [facility] PHYSICIANS  SPECIALIZING IN BREAKTHROUGHS  Radiation Oncology    On Treatment Visit Note      Tiffany Herrera      Date: 2022   MRN: [id number]   : 1960  Diagnosis: Bilateral breast cancer ER+      Reason for Visit:  On Radiation Treatment Visit     Treatment Summary to Date    Current Dose: 1330/5256, 1330/5256 cGy Fractions: ,       Chemotherapy  Chemo concurrent with radx?: No (Dr. Nieto)    Subjective:     Doing well with no complaints    Nursing ROS:   Nutrition Alteration  Diet Type: Patient's Preference  Skin  Skin Reaction: 1 - Faint erythema or dry desquamation  Skin Intervention: Aquaphor 3-4 x day  Skin Note: Patient to start Hydrocortisone 2.5 % to radiation site 2 x day        Cardiovascular  Respiratory effort: 1 - Normal - without distress        Psychosocial  Psychosocial Note: Patient denies fatigue  Pain Assessment  0-10 Pain Scale: 2  Pain Note: Patient has plate in R shoulder, attending PT 2 x week and doing stretches at home.      Objective:   /72 (BP Location: Left arm, Patient Position: Sitting, Cuff Size: Adult Regular)   Pulse 90   Temp 97.4  F (36.3  C) (Oral)   Resp 16   Wt 59.5 kg (131 lb 3.2 oz)   SpO2 97%   Gen: Appears well, in no acute distress  Skin: minimal diffuse erythema over treatment field    Labs:  CBC RESULTS: No results for input(s): WBC, RBC, HGB, HCT, MCV, MCH, MCHC, RDW, PLT in the last 82542 hours.  ELECTROLYTES:  No results for input(s): NA, POTASSIUM, CHLORIDE, DONNA, CO2, BUN, CR, GLC in the last 05893 hours.    Assessment:    Tolerating radiation therapy well.  All questions and concerns addressed.    Toxicities:  Dermatitis: Grade 1: Faint erythema  or dry desquamation    Plan:   1. Continue current therapy.    2. Hydrocortisone 2.5% cream BID and Aquaphor 3-4 times per day      Mosaiq chart and setup information reviewed  Ports checked    Medication Review  Med list reviewed with patient?: Yes    Educational Topic Discussed  Education Instructions: Skin cares, fatigue, PT         Woodrow Billingsley MD    Please do not send letter to referring physician.          Again, thank you for allowing me to participate in the care of your patient.        Sincerely,        ELMER Billingsley MD

## 2022-04-12 NOTE — PROGRESS NOTES
[facility] PHYSICIANS  SPECIALIZING IN BREAKTHROUGHS  Radiation Oncology    On Treatment Visit Note      Tiffany Herrera      Date: 2022   MRN: [id number]   : 1960  Diagnosis: Bilateral breast cancer ER+      Reason for Visit:  On Radiation Treatment Visit     Treatment Summary to Date    Current Dose: 1330/5256, 1330/5256 cGy Fractions: 5/20, 5/20      Chemotherapy  Chemo concurrent with radx?: No (Dr. Nieto)    Subjective:     Doing well with no complaints    Nursing ROS:   Nutrition Alteration  Diet Type: Patient's Preference  Skin  Skin Reaction: 1 - Faint erythema or dry desquamation  Skin Intervention: Aquaphor 3-4 x day  Skin Note: Patient to start Hydrocortisone 2.5 % to radiation site 2 x day        Cardiovascular  Respiratory effort: 1 - Normal - without distress        Psychosocial  Psychosocial Note: Patient denies fatigue  Pain Assessment  0-10 Pain Scale: 2  Pain Note: Patient has plate in R shoulder, attending PT 2 x week and doing stretches at home.      Objective:   /72 (BP Location: Left arm, Patient Position: Sitting, Cuff Size: Adult Regular)   Pulse 90   Temp 97.4  F (36.3  C) (Oral)   Resp 16   Wt 59.5 kg (131 lb 3.2 oz)   SpO2 97%   Gen: Appears well, in no acute distress  Skin: minimal diffuse erythema over treatment field    Labs:  CBC RESULTS: No results for input(s): WBC, RBC, HGB, HCT, MCV, MCH, MCHC, RDW, PLT in the last 97154 hours.  ELECTROLYTES:  No results for input(s): NA, POTASSIUM, CHLORIDE, DONNA, CO2, BUN, CR, GLC in the last 20188 hours.    Assessment:    Tolerating radiation therapy well.  All questions and concerns addressed.    Toxicities:  Dermatitis: Grade 1: Faint erythema or dry desquamation    Plan:   1. Continue current therapy.    2. Hydrocortisone 2.5% cream BID and Aquaphor 3-4 times per day      [facility] chart and setup information reviewed  Ports checked    Medication Review  Med list reviewed with patient?: Yes    Educational  Topic Discussed  Education Instructions: Skin cares, fatigue, PT         Woodrow Billingsley MD    Please do not send letter to referring physician.

## 2022-04-13 ENCOUNTER — APPOINTMENT (OUTPATIENT)
Dept: RADIATION ONCOLOGY | Facility: CLINIC | Age: 62
End: 2022-04-13
Payer: COMMERCIAL

## 2022-04-13 PROCEDURE — 77412 RADIATION TX DELIVERY LVL 3: CPT | Performed by: RADIOLOGY

## 2022-04-13 PROCEDURE — 77387 GUIDANCE FOR RADJ TX DLVR: CPT | Performed by: RADIOLOGY

## 2022-04-14 ENCOUNTER — APPOINTMENT (OUTPATIENT)
Dept: RADIATION ONCOLOGY | Facility: CLINIC | Age: 62
End: 2022-04-14
Payer: COMMERCIAL

## 2022-04-14 PROCEDURE — 77412 RADIATION TX DELIVERY LVL 3: CPT | Performed by: RADIOLOGY

## 2022-04-14 PROCEDURE — 77387 GUIDANCE FOR RADJ TX DLVR: CPT | Performed by: RADIOLOGY

## 2022-04-15 ENCOUNTER — APPOINTMENT (OUTPATIENT)
Dept: RADIATION ONCOLOGY | Facility: CLINIC | Age: 62
End: 2022-04-15
Payer: COMMERCIAL

## 2022-04-15 PROCEDURE — 77412 RADIATION TX DELIVERY LVL 3: CPT | Performed by: RADIOLOGY

## 2022-04-15 PROCEDURE — 77387 GUIDANCE FOR RADJ TX DLVR: CPT | Performed by: RADIOLOGY

## 2022-04-18 ENCOUNTER — APPOINTMENT (OUTPATIENT)
Dept: RADIATION ONCOLOGY | Facility: CLINIC | Age: 62
End: 2022-04-18
Payer: COMMERCIAL

## 2022-04-18 PROCEDURE — 77412 RADIATION TX DELIVERY LVL 3: CPT | Performed by: RADIOLOGY

## 2022-04-18 PROCEDURE — 77387 GUIDANCE FOR RADJ TX DLVR: CPT | Performed by: RADIOLOGY

## 2022-04-19 ENCOUNTER — APPOINTMENT (OUTPATIENT)
Dept: RADIATION ONCOLOGY | Facility: CLINIC | Age: 62
End: 2022-04-19
Payer: COMMERCIAL

## 2022-04-19 PROCEDURE — 77336 RADIATION PHYSICS CONSULT: CPT | Mod: 59 | Performed by: RADIOLOGY

## 2022-04-19 PROCEDURE — 77387 GUIDANCE FOR RADJ TX DLVR: CPT | Performed by: RADIOLOGY

## 2022-04-19 PROCEDURE — 77412 RADIATION TX DELIVERY LVL 3: CPT | Performed by: RADIOLOGY

## 2022-04-19 PROCEDURE — 77334 RADIATION TREATMENT AID(S): CPT | Performed by: RADIOLOGY

## 2022-04-19 PROCEDURE — 77307 TELETHX ISODOSE PLAN CPLX: CPT | Performed by: RADIOLOGY

## 2022-04-19 PROCEDURE — 77417 THER RADIOLOGY PORT IMAGE(S): CPT | Performed by: RADIOLOGY

## 2022-04-20 ENCOUNTER — APPOINTMENT (OUTPATIENT)
Dept: RADIATION ONCOLOGY | Facility: CLINIC | Age: 62
End: 2022-04-20
Payer: COMMERCIAL

## 2022-04-20 ENCOUNTER — OFFICE VISIT (OUTPATIENT)
Dept: RADIATION ONCOLOGY | Facility: CLINIC | Age: 62
End: 2022-04-20

## 2022-04-20 VITALS
HEART RATE: 86 BPM | RESPIRATION RATE: 16 BRPM | DIASTOLIC BLOOD PRESSURE: 83 MMHG | TEMPERATURE: 97.6 F | OXYGEN SATURATION: 100 % | WEIGHT: 133.5 LBS | SYSTOLIC BLOOD PRESSURE: 155 MMHG

## 2022-04-20 DIAGNOSIS — C50.211 MALIGNANT NEOPLASM OF UPPER-INNER QUADRANT OF RIGHT BREAST IN FEMALE, ESTROGEN RECEPTOR POSITIVE (H): Primary | ICD-10-CM

## 2022-04-20 DIAGNOSIS — Z17.0 MALIGNANT NEOPLASM OF UPPER-INNER QUADRANT OF RIGHT BREAST IN FEMALE, ESTROGEN RECEPTOR POSITIVE (H): Primary | ICD-10-CM

## 2022-04-20 PROCEDURE — 99207 PR DROP WITH A PROCEDURE: CPT | Performed by: RADIOLOGY

## 2022-04-20 ASSESSMENT — PAIN SCALES - GENERAL: PAINLEVEL: MODERATE PAIN (4)

## 2022-04-20 NOTE — LETTER
2022         RE: Tiffany Herrera  [id number]  [place] [address]        Dear Colleague,    Thank you for referring your patient, Tiffany Herrera, to the [facility] RADIATION ONCOLOGY [place]. Please see a copy of my visit note below.    [facility] PHYSICIANS  SPECIALIZING IN BREAKTHROUGHS  Radiation Oncology    On Treatment Visit Note      Tiffany Herrera      Date: 2022   MRN: [id number]   : 1960  Diagnosis: Bilateral breast cancer ER+      Reason for Visit:  On Radiation Treatment Visit     Treatment Summary to Date    Current Dose: 2926/5256, 2926/5256 cGy Fractions: ,       Chemotherapy  Chemo concurrent with radx?: No (Dr. Nieto)    Subjective:     Having more skin reaction this week.  Also has a long history of shoulder pain which is exacerbated with treatment position.    Nursing ROS:   Nutrition Alteration  Diet Type: Patient's Preference  Skin  Skin Reaction: 1 - Faint erythema or dry desquamation  Skin Intervention: Hydrocortisone 2.5 % 3 x day, Aquaphor 3-4 x day        Cardiovascular  Respiratory effort: 1 - Normal - without distress        Psychosocial  Psychosocial Note: Patient denies fatigue  Pain Assessment  0-10 Pain Scale: 4  Pain Treatment: Tylenol as needed for pain  Pain Note: Patient has plate in R shoulder, attending PT 2 x week and doing stretches at home.      Objective:   BP (!) 155/83 (BP Location: Left arm, Patient Position: Sitting, Cuff Size: Adult Regular)   Pulse 86   Temp 97.6  F (36.4  C) (Oral)   Resp 16   Wt 60.6 kg (133 lb 8 oz)   SpO2 100%   Gen: Appears well, in no acute distress  Skin: Minimal diffuse erythema over treatment field    Labs:  CBC RESULTS: No results for input(s): WBC, RBC, HGB, HCT, MCV, MCH, MCHC, RDW, PLT in the last 80566 hours.  ELECTROLYTES:  No results for input(s): NA, POTASSIUM, CHLORIDE, DONNA, CO2, BUN, CR, GLC in the last 18760 hours.    Assessment:    Tolerating radiation  therapy well.  All questions and concerns addressed.    Toxicities:  Dermatitis: Grade 1: Faint erythema or dry desquamation   Pain Grade 1    Plan:   1. Continue current therapy.    2. Skin care and pain management per above.      [facility] chart and setup information reviewed  Ports checked    Medication Review  Med list reviewed with patient?: Yes    Educational Topic Discussed  Education Instructions: Skin cares, fatigue, PT         Woodrow Billingsley MD    Please do not send letter to referring physician.          Again, thank you for allowing me to participate in the care of your patient.        Sincerely,        ELMER Billingsley MD

## 2022-04-21 ENCOUNTER — APPOINTMENT (OUTPATIENT)
Dept: RADIATION ONCOLOGY | Facility: CLINIC | Age: 62
End: 2022-04-21
Payer: COMMERCIAL

## 2022-04-21 PROCEDURE — 77412 RADIATION TX DELIVERY LVL 3: CPT | Performed by: SURGERY

## 2022-04-21 PROCEDURE — 77387 GUIDANCE FOR RADJ TX DLVR: CPT | Performed by: SURGERY

## 2022-04-21 NOTE — PROGRESS NOTES
[facility] PHYSICIANS  SPECIALIZING IN BREAKTHROUGHS  Radiation Oncology    On Treatment Visit Note      Tiffany Herrera      Date: 2022   MRN: [id number]   : 1960  Diagnosis: Bilateral breast cancer ER+      Reason for Visit:  On Radiation Treatment Visit     Treatment Summary to Date    Current Dose: 2926/5256, 2926/5256 cGy Fractions: ,       Chemotherapy  Chemo concurrent with radx?: No (Dr. Nieto)    Subjective:     Having more skin reaction this week.  Also has a long history of shoulder pain which is exacerbated with treatment position.    Nursing ROS:   Nutrition Alteration  Diet Type: Patient's Preference  Skin  Skin Reaction: 1 - Faint erythema or dry desquamation  Skin Intervention: Hydrocortisone 2.5 % 3 x day, Aquaphor 3-4 x day        Cardiovascular  Respiratory effort: 1 - Normal - without distress        Psychosocial  Psychosocial Note: Patient denies fatigue  Pain Assessment  0-10 Pain Scale: 4  Pain Treatment: Tylenol as needed for pain  Pain Note: Patient has plate in R shoulder, attending PT 2 x week and doing stretches at home.      Objective:   BP (!) 155/83 (BP Location: Left arm, Patient Position: Sitting, Cuff Size: Adult Regular)   Pulse 86   Temp 97.6  F (36.4  C) (Oral)   Resp 16   Wt 60.6 kg (133 lb 8 oz)   SpO2 100%   Gen: Appears well, in no acute distress  Skin: Minimal diffuse erythema over treatment field    Labs:  CBC RESULTS: No results for input(s): WBC, RBC, HGB, HCT, MCV, MCH, MCHC, RDW, PLT in the last 60266 hours.  ELECTROLYTES:  No results for input(s): NA, POTASSIUM, CHLORIDE, DONNA, CO2, BUN, CR, GLC in the last 79833 hours.    Assessment:    Tolerating radiation therapy well.  All questions and concerns addressed.    Toxicities:  Dermatitis: Grade 1: Faint erythema or dry desquamation   Pain Grade 1    Plan:   1. Continue current therapy.    2. Skin care and pain management per above.      [employer] chart and setup information  reviewed  Ports checked    Medication Review  Med list reviewed with patient?: Yes    Educational Topic Discussed  Education Instructions: Skin cares, fatigue, PT         Woodrow Billingsley MD    Please do not send letter to referring physician.

## 2022-04-22 ENCOUNTER — APPOINTMENT (OUTPATIENT)
Dept: RADIATION ONCOLOGY | Facility: CLINIC | Age: 62
End: 2022-04-22
Payer: COMMERCIAL

## 2022-04-22 PROCEDURE — 77387 GUIDANCE FOR RADJ TX DLVR: CPT | Performed by: SURGERY

## 2022-04-22 PROCEDURE — 77412 RADIATION TX DELIVERY LVL 3: CPT | Performed by: SURGERY

## 2022-04-25 ENCOUNTER — APPOINTMENT (OUTPATIENT)
Dept: RADIATION ONCOLOGY | Facility: CLINIC | Age: 62
End: 2022-04-25
Payer: COMMERCIAL

## 2022-04-25 PROCEDURE — 77412 RADIATION TX DELIVERY LVL 3: CPT | Performed by: RADIOLOGY

## 2022-04-25 PROCEDURE — 77387 GUIDANCE FOR RADJ TX DLVR: CPT | Performed by: RADIOLOGY

## 2022-04-26 ENCOUNTER — APPOINTMENT (OUTPATIENT)
Dept: RADIATION ONCOLOGY | Facility: CLINIC | Age: 62
End: 2022-04-26
Payer: COMMERCIAL

## 2022-04-26 PROCEDURE — 77412 RADIATION TX DELIVERY LVL 3: CPT | Performed by: RADIOLOGY

## 2022-04-26 PROCEDURE — 77427 RADIATION TX MANAGEMENT X5: CPT | Performed by: RADIOLOGY

## 2022-04-26 PROCEDURE — 77336 RADIATION PHYSICS CONSULT: CPT | Performed by: RADIOLOGY

## 2022-04-26 PROCEDURE — 77387 GUIDANCE FOR RADJ TX DLVR: CPT | Performed by: RADIOLOGY

## 2022-04-27 ENCOUNTER — OFFICE VISIT (OUTPATIENT)
Dept: RADIATION ONCOLOGY | Facility: CLINIC | Age: 62
End: 2022-04-27
Payer: COMMERCIAL

## 2022-04-27 VITALS
HEART RATE: 103 BPM | TEMPERATURE: 98 F | SYSTOLIC BLOOD PRESSURE: 138 MMHG | WEIGHT: 131.8 LBS | RESPIRATION RATE: 16 BRPM | DIASTOLIC BLOOD PRESSURE: 76 MMHG | OXYGEN SATURATION: 99 %

## 2022-04-27 DIAGNOSIS — Z17.0 MALIGNANT NEOPLASM OF UPPER-INNER QUADRANT OF RIGHT BREAST IN FEMALE, ESTROGEN RECEPTOR POSITIVE (H): Primary | ICD-10-CM

## 2022-04-27 DIAGNOSIS — C50.812 MALIGNANT NEOPLASM OF OVERLAPPING SITES OF LEFT BREAST IN FEMALE, ESTROGEN RECEPTOR POSITIVE (H): ICD-10-CM

## 2022-04-27 DIAGNOSIS — C50.211 MALIGNANT NEOPLASM OF UPPER-INNER QUADRANT OF RIGHT BREAST IN FEMALE, ESTROGEN RECEPTOR POSITIVE (H): Primary | ICD-10-CM

## 2022-04-27 DIAGNOSIS — Z17.0 MALIGNANT NEOPLASM OF OVERLAPPING SITES OF LEFT BREAST IN FEMALE, ESTROGEN RECEPTOR POSITIVE (H): ICD-10-CM

## 2022-04-27 PROCEDURE — 99207 PR DROP WITH A PROCEDURE: CPT | Performed by: RADIOLOGY

## 2022-04-27 RX ORDER — MOMETASONE FUROATE 1 MG/G
CREAM TOPICAL 2 TIMES DAILY
Qty: 50 G | Refills: 1 | Status: SHIPPED | OUTPATIENT
Start: 2022-04-27 | End: 2022-05-10

## 2022-04-27 ASSESSMENT — PAIN SCALES - GENERAL: PAINLEVEL: NO PAIN (0)

## 2022-04-27 NOTE — PROGRESS NOTES
[facility] PHYSICIANS  SPECIALIZING IN BREAKTHROUGHS  Radiation Oncology    On Treatment Visit Note      Tiffany Herrera      Date: 2022   MRN: [id number]   : 1960  Diagnosis: Bilateral breast cancer ER+      Reason for Visit:  On Radiation Treatment Visit     Treatment Summary to Date    Current Dose: 4256/5256, 4256/5656 cGy Fractions: 16, 16      Chemotherapy  Chemo concurrent with radx?: No (Dr. Nieto)    Subjective:     More skin reaction this week.  Denies significant discomfort.    Nursing ROS:   Nutrition Alteration  Diet Type: Patient's Preference  Skin  Skin Reaction: 2 - Moderate to brisk erythema, patchy moist desquamation, mostly confined to skin folds and creases, moderate edema (no desquamation)  Skin Intervention: Mometasone prescription, patient to apply 2 x day and Aquaphor 3-4 x day        Cardiovascular  Respiratory effort: 1 - Normal - without distress        Psychosocial  Psychosocial Note: Patient reports increased fatigue, discussed increasing water intake, protein and remaining active when able.  Pain Assessment  0-10 Pain Scale: 4  Pain Treatment: Tylenol as needed for pain  Pain Note: Patient has plate in R shoulder, attending PT 2 x week and doing stretches at home.      Objective:   /76 (BP Location: Left arm, Patient Position: Sitting, Cuff Size: Adult Regular)   Pulse 103   Temp 98  F (36.7  C) (Oral)   Resp 16   Wt 59.8 kg (131 lb 12.8 oz)   SpO2 99%   Gen: Appears well, in no acute distress  Skin: Moderate diffuse erythema over treatment field    Labs:  CBC RESULTS: No results for input(s): WBC, RBC, HGB, HCT, MCV, MCH, MCHC, RDW, PLT in the last 10820 hours.  ELECTROLYTES:  No results for input(s): NA, POTASSIUM, CHLORIDE, DONNA, CO2, BUN, CR, GLC in the last 49463 hours.    Assessment:    Tolerating radiation therapy well.  All questions and concerns addressed.  Increase skin reaction likely due to patient's smoking and sunbathing  history.  She is now on her boost and I anticipate her skin will have peak reaction in approximately 2 weeks.  Education provided regarding caffeinated sodas and maintaining hydration.    Toxicities:  Fatigue: Grade 1: Fatigue relieved by rest  Pain: Grade 0: No toxicity  Dermatitis: Grade 2: Moderate to brisk erythema; patchy moist desquamation, mostly confined to skin folds and creases; moderate erythema  (No desquamation)    Plan:   1. Continue current therapy.    2. Will switch to mometasone/intermediate grade steroid twice daily to help with skin reaction.  Aquaphor 3-4 times per day.  3. Increase water intake to help with fatigue.      [employer] chart and setup information reviewed  Ports checked    Medication Review  Med list reviewed with patient?: Yes  Med Note: Mometasone prescription sent to pharmacy.    Educational Topic Discussed  Education Instructions: Skin cares, fatigue, PT         Woodrow Billingsley MD    Please do not send letter to referring physician.

## 2022-04-27 NOTE — LETTER
2022         RE: Tiffany Herrera  [id number]  [place] [facility] [place] [facility] [address]        Dear Colleague,    Thank you for referring your patient, Tiffany Herrera, to the [facility] RADIATION ONCOLOGY [place]. Please see a copy of my visit note below.    [facility] PHYSICIANS  SPECIALIZING IN BREAKTHROUGHS  Radiation Oncology    On Treatment Visit Note      Tiffany Herrera      Date: 2022   MRN: [id number]   : 1960  Diagnosis: Bilateral breast cancer ER+      Reason for Visit:  On Radiation Treatment Visit     Treatment Summary to Date    Current Dose: 4256/5256, 4256/5656 cGy Fractions: ,       Chemotherapy  Chemo concurrent with radx?: No (Dr. Nieto)    Subjective:     More skin reaction this week.  Denies significant discomfort.    Nursing ROS:   Nutrition Alteration  Diet Type: Patient's Preference  Skin  Skin Reaction: 2 - Moderate to brisk erythema, patchy moist desquamation, mostly confined to skin folds and creases, moderate edema (no desquamation)  Skin Intervention: Mometasone prescription, patient to apply 2 x day and Aquaphor 3-4 x day        Cardiovascular  Respiratory effort: 1 - Normal - without distress        Psychosocial  Psychosocial Note: Patient reports increased fatigue, discussed increasing water intake, protein and remaining active when able.  Pain Assessment  0-10 Pain Scale: 4  Pain Treatment: Tylenol as needed for pain  Pain Note: Patient has plate in R shoulder, attending PT 2 x week and doing stretches at home.      Objective:   /76 (BP Location: Left arm, Patient Position: Sitting, Cuff Size: Adult Regular)   Pulse 103   Temp 98  F (36.7  C) (Oral)   Resp 16   Wt 59.8 kg (131 lb 12.8 oz)   SpO2 99%   Gen: Appears well, in no acute distress  Skin: Moderate diffuse erythema over treatment field    Labs:  CBC RESULTS: No results for input(s): WBC, RBC, HGB, HCT, MCV, MCH, MCHC, RDW, PLT in the last 71186  hours.  ELECTROLYTES:  No results for input(s): NA, POTASSIUM, CHLORIDE, DONNA, CO2, BUN, CR, GLC in the last 39047 hours.    Assessment:    Tolerating radiation therapy well.  All questions and concerns addressed.  Increase skin reaction likely due to patient's smoking and sunbathing history.  She is now on her boost and I anticipate her skin will have peak reaction in approximately 2 weeks.  Education provided regarding caffeinated sodas and maintaining hydration.    Toxicities:  Fatigue: Grade 1: Fatigue relieved by rest  Pain: Grade 0: No toxicity  Dermatitis: Grade 2: Moderate to brisk erythema; patchy moist desquamation, mostly confined to skin folds and creases; moderate erythema  (No desquamation)    Plan:   1. Continue current therapy.    2. Will switch to mometasone/intermediate grade steroid twice daily to help with skin reaction.  Aquaphor 3-4 times per day.  3. Increase water intake to help with fatigue.      [employer] chart and setup information reviewed  Ports checked    Medication Review  Med list reviewed with patient?: Yes  Med Note: Mometasone prescription sent to pharmacy.    Educational Topic Discussed  Education Instructions: Skin cares, fatigue, PT         Woodrow Billingsley MD    Please do not send letter to referring physician.          Again, thank you for allowing me to participate in the care of your patient.        Sincerely,        ELMER Billingsley MD

## 2022-04-28 ENCOUNTER — APPOINTMENT (OUTPATIENT)
Dept: RADIATION ONCOLOGY | Facility: CLINIC | Age: 62
End: 2022-04-28
Payer: COMMERCIAL

## 2022-04-28 PROCEDURE — 77387 GUIDANCE FOR RADJ TX DLVR: CPT | Performed by: SURGERY

## 2022-04-28 PROCEDURE — 77417 THER RADIOLOGY PORT IMAGE(S): CPT | Performed by: SURGERY

## 2022-04-28 PROCEDURE — 77412 RADIATION TX DELIVERY LVL 3: CPT | Performed by: SURGERY

## 2022-04-29 ENCOUNTER — APPOINTMENT (OUTPATIENT)
Dept: RADIATION ONCOLOGY | Facility: CLINIC | Age: 62
End: 2022-04-29
Payer: COMMERCIAL

## 2022-04-29 PROCEDURE — 77412 RADIATION TX DELIVERY LVL 3: CPT | Performed by: SURGERY

## 2022-04-29 PROCEDURE — 77387 GUIDANCE FOR RADJ TX DLVR: CPT | Performed by: SURGERY

## 2022-05-02 ENCOUNTER — APPOINTMENT (OUTPATIENT)
Dept: RADIATION ONCOLOGY | Facility: CLINIC | Age: 62
End: 2022-05-02
Payer: COMMERCIAL

## 2022-05-02 PROCEDURE — 77387 GUIDANCE FOR RADJ TX DLVR: CPT | Performed by: RADIOLOGY

## 2022-05-02 PROCEDURE — 77412 RADIATION TX DELIVERY LVL 3: CPT | Performed by: RADIOLOGY

## 2022-05-03 ENCOUNTER — APPOINTMENT (OUTPATIENT)
Dept: RADIATION ONCOLOGY | Facility: CLINIC | Age: 62
End: 2022-05-03
Payer: COMMERCIAL

## 2022-05-03 PROCEDURE — 77387 GUIDANCE FOR RADJ TX DLVR: CPT | Performed by: RADIOLOGY

## 2022-05-03 PROCEDURE — 77412 RADIATION TX DELIVERY LVL 3: CPT | Performed by: RADIOLOGY

## 2022-05-03 PROCEDURE — 77427 RADIATION TX MANAGEMENT X5: CPT | Performed by: RADIOLOGY

## 2022-05-03 PROCEDURE — 77336 RADIATION PHYSICS CONSULT: CPT | Performed by: RADIOLOGY

## 2022-05-04 ENCOUNTER — OFFICE VISIT (OUTPATIENT)
Dept: RADIATION ONCOLOGY | Facility: CLINIC | Age: 62
End: 2022-05-04
Payer: COMMERCIAL

## 2022-05-04 VITALS
HEART RATE: 106 BPM | WEIGHT: 131 LBS | TEMPERATURE: 98 F | OXYGEN SATURATION: 98 % | RESPIRATION RATE: 16 BRPM | SYSTOLIC BLOOD PRESSURE: 113 MMHG | DIASTOLIC BLOOD PRESSURE: 71 MMHG

## 2022-05-04 DIAGNOSIS — C50.812 MALIGNANT NEOPLASM OF OVERLAPPING SITES OF LEFT BREAST IN FEMALE, ESTROGEN RECEPTOR POSITIVE (H): ICD-10-CM

## 2022-05-04 DIAGNOSIS — C50.211 MALIGNANT NEOPLASM OF UPPER-INNER QUADRANT OF RIGHT BREAST IN FEMALE, ESTROGEN RECEPTOR POSITIVE (H): Primary | ICD-10-CM

## 2022-05-04 DIAGNOSIS — Z17.0 MALIGNANT NEOPLASM OF OVERLAPPING SITES OF LEFT BREAST IN FEMALE, ESTROGEN RECEPTOR POSITIVE (H): ICD-10-CM

## 2022-05-04 DIAGNOSIS — Z17.0 MALIGNANT NEOPLASM OF UPPER-INNER QUADRANT OF RIGHT BREAST IN FEMALE, ESTROGEN RECEPTOR POSITIVE (H): Primary | ICD-10-CM

## 2022-05-04 PROCEDURE — 77412 RADIATION TX DELIVERY LVL 3: CPT | Performed by: RADIOLOGY

## 2022-05-04 PROCEDURE — 77387 GUIDANCE FOR RADJ TX DLVR: CPT | Performed by: RADIOLOGY

## 2022-05-04 PROCEDURE — 99207 PR DROP WITH A PROCEDURE: CPT | Performed by: RADIOLOGY

## 2022-05-04 RX ORDER — MULTIVIT WITH MINERALS/LUTEIN
1 TABLET ORAL DAILY
COMMUNITY

## 2022-05-04 RX ORDER — LACTOBACILLUS RHAMNOSUS GG 10B CELL
1 CAPSULE ORAL 2 TIMES DAILY
COMMUNITY

## 2022-05-04 ASSESSMENT — PAIN SCALES - GENERAL: PAINLEVEL: MODERATE PAIN (4)

## 2022-05-04 NOTE — PROGRESS NOTES
[facility] PHYSICIANS  SPECIALIZING IN BREAKTHROUGHS  Radiation Oncology    On Treatment Visit Note      Tiffany Herrera      Date: 2022   MRN: [id number]   : 1960  Diagnosis: Bilateral breast cancer ER+      Reason for Visit:  On Radiation Treatment Visit     Treatment Summary to Date    Current Dose: 5256/5256, 5256/5656 cGy Fractions: ,       Chemotherapy  Chemo concurrent with radx?: No (Dr. Nieto)    Subjective:     Significant skin redness and fatigue this week.  No desquamation/peeling.  Pain in shoulder due to positioning and prior injury    Nursing ROS:   Nutrition Alteration  Diet Type: Patient's Preference  Skin  Skin Reaction: 2 - Moderate to brisk erythema, patchy moist desquamation, mostly confined to skin folds and creases, moderate edema (no desquamation)  Skin Intervention: Patient to apply mometasone 2 x day for 1 week post radiation therapy and then stop. Patient was instructed to not apply mometasone to any open areas. Patient to continue Aquaphor 3-4 x day until skin is healed.        Cardiovascular  Respiratory effort: 1 - Normal - without distress        Psychosocial  Psychosocial Note: Patient reports increased fatigue, discussed increasing water intake, protein and remaining active when able.  Pain Assessment  0-10 Pain Scale: 4  Pain Treatment: Tylenol as needed for pain  Pain Note: Patient has plate in R shoulder, attending PT 2 x week and doing stretches at home.      Objective:   /71 (Patient Position: Sitting)   Pulse 106   Temp 98  F (36.7  C) (Oral)   Resp 16   Wt 59.4 kg (131 lb)   SpO2 98%   Gen: Appears well, in no acute distress  Skin: Moderate diffuse erythema over treatment field with no desquamation    Labs:  CBC RESULTS: No results for input(s): WBC, RBC, HGB, HCT, MCV, MCH, MCHC, RDW, PLT in the last 64030 hours.  ELECTROLYTES:  No results for input(s): NA, POTASSIUM, CHLORIDE, DONNA, CO2, BUN, CR, GLC in the last 07263  hours.    Assessment:    Tolerating radiation therapy well.  All questions and concerns addressed.    Toxicities:  Fatigue: Grade 1: Fatigue relieved by rest  Pain: Grade 1: Mild pain  Dermatitis: Grade 2: Moderate to brisk erythema; patchy moist desquamation, mostly confined to skin folds and creases; moderate erythema    Plan:   1. Continue current therapy.    2. Skin care per above.  3. Physical therapy as previously directed for shoulder.  4. Continue to increase hydration to help with fatigue.      [employer] chart and setup information reviewed  Ports checked    Medication Review  Med list reviewed with patient?: Yes    Educational Topic Discussed  Additional Instructions: 1 and 3.5 month follow up with April CAMPBELL. RN skin check next week. Patient is starting Letrozole in 3 weeks and has follow up with Dr. Nieto in 4 weeks.  Education Instructions: Skin cares, fatigue, PT         Woodrow Billingsley MD    Please do not send letter to referring physician.

## 2022-05-04 NOTE — LETTER
2022         RE: Tiffany Herrera  [id number]  [facility] [address]        Dear Colleague,    Thank you for referring your patient, Tiffany Herrera, to the [facility] RADIATION ONCOLOGY [place]. Please see a copy of my visit note below.    [facility] PHYSICIANS  SPECIALIZING IN BREAKTHROUGHS  Radiation Oncology    On Treatment Visit Note      Tiffany Herrera      Date: 2022   MRN: [id number]   : 1960  Diagnosis: Bilateral breast cancer ER+      Reason for Visit:  On Radiation Treatment Visit     Treatment Summary to Date    Current Dose: 5256/5256, 5256/5656 cGy Fractions: ,       Chemotherapy  Chemo concurrent with radx?: No (Dr. Nieto)    Subjective:     Significant skin redness and fatigue this week.  No desquamation/peeling.  Pain in shoulder due to positioning and prior injury    Nursing ROS:   Nutrition Alteration  Diet Type: Patient's Preference  Skin  Skin Reaction: 2 - Moderate to brisk erythema, patchy moist desquamation, mostly confined to skin folds and creases, moderate edema (no desquamation)  Skin Intervention: Patient to apply mometasone 2 x day for 1 week post radiation therapy and then stop. Patient was instructed to not apply mometasone to any open areas. Patient to continue Aquaphor 3-4 x day until skin is healed.        Cardiovascular  Respiratory effort: 1 - Normal - without distress        Psychosocial  Psychosocial Note: Patient reports increased fatigue, discussed increasing water intake, protein and remaining active when able.  Pain Assessment  0-10 Pain Scale: 4  Pain Treatment: Tylenol as needed for pain  Pain Note: Patient has plate in R shoulder, attending PT 2 x week and doing stretches at home.      Objective:   /71 (Patient Position: Sitting)   Pulse 106   Temp 98  F (36.7  C) (Oral)   Resp 16   Wt 59.4 kg (131 lb)   SpO2 98%   Gen: Appears well, in no acute distress  Skin: Moderate diffuse erythema over  treatment field with no desquamation    Labs:  CBC RESULTS: No results for input(s): WBC, RBC, HGB, HCT, MCV, MCH, MCHC, RDW, PLT in the last 68482 hours.  ELECTROLYTES:  No results for input(s): NA, POTASSIUM, CHLORIDE, DONNA, CO2, BUN, CR, GLC in the last 17971 hours.    Assessment:    Tolerating radiation therapy well.  All questions and concerns addressed.    Toxicities:  Fatigue: Grade 1: Fatigue relieved by rest  Pain: Grade 1: Mild pain  Dermatitis: Grade 2: Moderate to brisk erythema; patchy moist desquamation, mostly confined to skin folds and creases; moderate erythema    Plan:   1. Continue current therapy.    2. Skin care per above.  3. Physical therapy as previously directed for shoulder.  4. Continue to increase hydration to help with fatigue.      [employer] chart and setup information reviewed  Ports checked    Medication Review  Med list reviewed with patient?: Yes    Educational Topic Discussed  Additional Instructions: 1 and 3.5 month follow up with April CAMPBELL. RN skin check next week. Patient is starting Letrozole in 3 weeks and has follow up with Dr. Nieto in 4 weeks.  Education Instructions: Skin cares, fatigue, PT         Woodrow Billingsley MD    Please do not send letter to referring physician.          Again, thank you for allowing me to participate in the care of your patient.        Sincerely,        ELMER Billingsley MD

## 2022-05-05 ENCOUNTER — APPOINTMENT (OUTPATIENT)
Dept: RADIATION ONCOLOGY | Facility: CLINIC | Age: 62
End: 2022-05-05
Payer: COMMERCIAL

## 2022-05-06 ENCOUNTER — APPOINTMENT (OUTPATIENT)
Dept: RADIATION ONCOLOGY | Facility: CLINIC | Age: 62
End: 2022-05-06
Payer: COMMERCIAL

## 2022-05-06 PROCEDURE — 77387 GUIDANCE FOR RADJ TX DLVR: CPT | Performed by: RADIOLOGY

## 2022-05-06 PROCEDURE — 77417 THER RADIOLOGY PORT IMAGE(S): CPT | Performed by: RADIOLOGY

## 2022-05-06 PROCEDURE — 77412 RADIATION TX DELIVERY LVL 3: CPT | Performed by: RADIOLOGY

## 2022-05-09 ENCOUNTER — APPOINTMENT (OUTPATIENT)
Dept: RADIATION ONCOLOGY | Facility: CLINIC | Age: 62
End: 2022-05-09
Payer: COMMERCIAL

## 2022-05-09 PROCEDURE — 77427 RADIATION TX MANAGEMENT X5: CPT | Performed by: RADIOLOGY

## 2022-05-09 PROCEDURE — 77336 RADIATION PHYSICS CONSULT: CPT | Performed by: RADIOLOGY

## 2022-05-09 PROCEDURE — 77412 RADIATION TX DELIVERY LVL 3: CPT | Performed by: RADIOLOGY

## 2022-05-09 PROCEDURE — 77387 GUIDANCE FOR RADJ TX DLVR: CPT | Performed by: RADIOLOGY

## 2022-05-10 RX ORDER — MOMETASONE FUROATE 1 MG/G
CREAM TOPICAL 2 TIMES DAILY
Qty: 50 G | Refills: 1 | Status: SHIPPED | OUTPATIENT
Start: 2022-05-10

## 2022-05-16 ENCOUNTER — PATIENT OUTREACH (OUTPATIENT)
Dept: RADIATION ONCOLOGY | Facility: CLINIC | Age: 62
End: 2022-05-16
Payer: COMMERCIAL

## 2022-05-16 NOTE — TELEPHONE ENCOUNTER
Attempted to contact patient to schedule a return appointment with RN for skin check. Patient has not returned Dr. Billingsley's clinic scheduling phone calls and voicemail is box now full. Left voicemail on home phone for patient to call Dr. Billingsley's office with an update on how she is healing since completing treatment.    Danae Yang RN

## 2022-05-17 ENCOUNTER — DOCUMENTATION ONLY (OUTPATIENT)
Dept: RADIATION ONCOLOGY | Facility: CLINIC | Age: 62
End: 2022-05-17
Payer: COMMERCIAL

## 2022-05-17 DIAGNOSIS — Z17.0 MALIGNANT NEOPLASM OF UPPER-INNER QUADRANT OF RIGHT BREAST IN FEMALE, ESTROGEN RECEPTOR POSITIVE (H): Primary | ICD-10-CM

## 2022-05-17 DIAGNOSIS — C50.211 MALIGNANT NEOPLASM OF UPPER-INNER QUADRANT OF RIGHT BREAST IN FEMALE, ESTROGEN RECEPTOR POSITIVE (H): Primary | ICD-10-CM

## 2022-05-17 NOTE — PROGRESS NOTES
Radiotherapy Treatment Summary              PATIENT: Tiffany Herrera  MEDICAL RECORD NO: [id number]   : 1960    PATHOLOGY/STAGE: This is a 61 year old woman with bilateral breast cancer s/p two lumpectomies and SLNBx on the left side and single lumpectomy and SLNBx on the right. On the left she had Grade 1 IDCA w/ G1 DCIS, pT1bN0 ER/MT positive HER-2/misael negative.  On the right she had G2 IDCA w/ G3 DCIS excised with close noninvasive margin, pT1cN0 ER/MT positive HER-2/misael negative.    INTENT OF RADIOTHERAPY: Curative    CONCURRENT SYSTEMIC THERAPY:  None           SITE OF TREATMENT:  Bilateral Breasts    DATES  OF TREATMENT:  Left breast 22-5/3/22  Right breast 22-22    TOTAL DOSE OF TREATMENT:   Left breast 4,256 cGy and boost 5,256 cGy  Right breast 4,256 cGy and boost 5,656 cGy    DOSE PER FRACTION OF TREATMENT: 266 cGy for bilateral breasts and 200 for bilateral boost fields       COMMENT/TOXICITY:  Grade 2 skin reaction managed with moisturizer and topical steroids. Patient did have mild fatigue relieved by rest.                PAIN MANAGEMENT:  Patient used tylenol as needed for pain.                         FOLLOW UP PLAN:  Patient will follow up with April Parry NP in 1 month and in 3.5 months  Patient will continue close follow up with medical oncology.     Woodrow Billingsley MD  Department of Radiation Oncology  [facility]     CC  Patient Care Team:  No Ref-Primary, Physician as PCP - General  Antonietta Billingsley MD as MD (Radiation Oncology)  Danae Yang RN as Registered Nurse  Antonietta Billingsley MD as Assigned Cancer Care Provider

## 2022-05-26 ENCOUNTER — PATIENT OUTREACH (OUTPATIENT)
Dept: RADIATION ONCOLOGY | Facility: CLINIC | Age: 62
End: 2022-05-26
Payer: COMMERCIAL

## 2022-05-26 NOTE — PROGRESS NOTES
Attempted to contact patient to assess how she is feeling since completing radiation therapy. Requested call back to Dr. Billingsley's office to schedule her follow up appointments.     Danae Yang RN

## 2022-05-27 ENCOUNTER — PATIENT OUTREACH (OUTPATIENT)
Dept: RADIATION ONCOLOGY | Facility: CLINIC | Age: 62
End: 2022-05-27
Payer: COMMERCIAL

## 2022-05-27 NOTE — PROGRESS NOTES
Received voicemail from Tiffany stating that her skin has healed since completing radiation therapy and she continues Aquaphor daily to the site. Attempted to contact patient, left voicemail that our  will contact her to set up a virtual return appointment with April Parry NP at 1 month and 3.5 months post radiation therapy. Patient to call Dr. Billingsley's office at [phone] with any further questions or concerns.    Danae Yang RN

## 2022-06-23 ENCOUNTER — PATIENT OUTREACH (OUTPATIENT)
Dept: RADIATION ONCOLOGY | Facility: CLINIC | Age: 62
End: 2022-06-23

## 2022-06-23 NOTE — PROGRESS NOTES
Radiation Therapy  attempted to contact patient x 4 to schedule return appointments with April Parry NP. Messages left for patient to please contact our office.    Danae Yang RN

## 2022-07-24 ENCOUNTER — HEALTH MAINTENANCE LETTER (OUTPATIENT)
Age: 62
End: 2022-07-24

## 2022-10-03 ENCOUNTER — HEALTH MAINTENANCE LETTER (OUTPATIENT)
Age: 62
End: 2022-10-03

## 2023-02-12 ENCOUNTER — HEALTH MAINTENANCE LETTER (OUTPATIENT)
Age: 63
End: 2023-02-12

## 2023-05-21 ENCOUNTER — HEALTH MAINTENANCE LETTER (OUTPATIENT)
Age: 63
End: 2023-05-21

## 2023-10-22 ENCOUNTER — HEALTH MAINTENANCE LETTER (OUTPATIENT)
Age: 63
End: 2023-10-22

## 2024-03-10 ENCOUNTER — HEALTH MAINTENANCE LETTER (OUTPATIENT)
Age: 64
End: 2024-03-10

## 2024-07-28 ENCOUNTER — HEALTH MAINTENANCE LETTER (OUTPATIENT)
Age: 64
End: 2024-07-28

## 2024-12-14 ENCOUNTER — HEALTH MAINTENANCE LETTER (OUTPATIENT)
Age: 64
End: 2024-12-14

## 2025-03-16 ENCOUNTER — HEALTH MAINTENANCE LETTER (OUTPATIENT)
Age: 65
End: 2025-03-16

## 2025-06-29 ENCOUNTER — HEALTH MAINTENANCE LETTER (OUTPATIENT)
Age: 65
End: 2025-06-29